# Patient Record
Sex: FEMALE | URBAN - METROPOLITAN AREA
[De-identification: names, ages, dates, MRNs, and addresses within clinical notes are randomized per-mention and may not be internally consistent; named-entity substitution may affect disease eponyms.]

---

## 2020-10-30 ENCOUNTER — IMPORTED ENCOUNTER (OUTPATIENT)
Dept: URBAN - METROPOLITAN AREA CLINIC 1 | Facility: CLINIC | Age: 25
End: 2020-10-30

## 2020-10-30 PROBLEM — H44.23: Noted: 2020-10-30

## 2020-10-30 PROBLEM — H52.223: Noted: 2020-10-30

## 2020-10-30 PROCEDURE — S0620 ROUTINE OPHTHALMOLOGICAL EXA: HCPCS

## 2020-10-30 NOTE — PATIENT DISCUSSION
1. Myopia w/ Astigmatism OU -- Rx was given for correction if indicated and requested. 2. Glaucoma Suspect OU -- (C/D: 0.70/0.75) IOP 18 OU. (-) Family Hx. 3.  Ocular Migraine -- Discussed with patient and reassurance given. CTL Trials given to patient today ([employer] [facility]). Return for an appointment in 1-2 week cc with Dr. Jacobsen. Return for an appointment in 1 month 30/OCT/VF with Dr. Jacobsen.

## 2020-11-12 ENCOUNTER — IMPORTED ENCOUNTER (OUTPATIENT)
Dept: URBAN - METROPOLITAN AREA CLINIC 1 | Facility: CLINIC | Age: 25
End: 2020-11-12

## 2020-11-12 NOTE — PATIENT DISCUSSION
1.  CC today: comfort and vision good. Finalized CTL Rx. Printed and gave patient CTL RxReturn for an appointment in 1 yr 40/cc with Dr. Rayshawn Costello.

## 2020-12-10 ENCOUNTER — IMPORTED ENCOUNTER (OUTPATIENT)
Dept: URBAN - METROPOLITAN AREA CLINIC 1 | Facility: CLINIC | Age: 25
End: 2020-12-10

## 2020-12-10 PROBLEM — H40.013: Noted: 2020-12-10

## 2020-12-10 PROBLEM — G43.909: Noted: 2020-12-10

## 2020-12-10 PROCEDURE — 92014 COMPRE OPH EXAM EST PT 1/>: CPT

## 2020-12-10 PROCEDURE — 92133 CPTRZD OPH DX IMG PST SGM ON: CPT

## 2020-12-10 PROCEDURE — 92083 EXTENDED VISUAL FIELD XM: CPT

## 2020-12-10 NOTE — PATIENT DISCUSSION
1.  Glaucoma Suspect OU- (C/D: 0.70/0.75) IOP 20/18 (-) Family Hx. Patient is considered Low Risk. Condition was discussed with patient and patient understands. Will continue to monitor patient for any progression in condition. Patient was advised to call us with any problems questions or concerns. OCT and VF done today shows WNL. 2. Ocular Migraine- Reassurance and explanation was given to patient. Return for an appointment in OCT 40/CC with Dr. Rayshawn Costello. Return for an appointment in 1 yr 30/OCT with Dr. Rayshawn Costello.

## 2021-10-19 ENCOUNTER — IMPORTED ENCOUNTER (OUTPATIENT)
Dept: URBAN - METROPOLITAN AREA CLINIC 1 | Facility: CLINIC | Age: 26
End: 2021-10-19

## 2021-10-19 PROBLEM — H52.13: Noted: 2021-10-19

## 2021-10-19 PROCEDURE — S0621 ROUTINE OPHTHALMOLOGICAL EXA: HCPCS

## 2021-10-19 NOTE — PATIENT DISCUSSION
1. Myopia: Rx was given for correction if indicated and requested. 2. Glaucoma Suspect OU (CD 0.70/0.75) - IOP stable (-) Family Hx. Patient is considered Low Risk. 3. H/o Ocular MigraineMRX for glasses given. Finalized CTL Juliana Riley for an appointment in 1 year 40/cc with Dr. Soraya Jean.

## 2021-10-19 NOTE — PATIENT DISCUSSION
Glaucoma Suspect OU (CD 0.70/0.75) - IOP stable (-) Family Hx. Patient is considered Low Risk. 3. H/o Ocular MigraineMRX for glasses given. Finalized CTL Beena Rodrigues for an appointment in 1 year 40/cc with Dr. Shonna Goncalves.

## 2022-04-02 ASSESSMENT — KERATOMETRY
OS_K1POWER_DIOPTERS: 45.50
OS_AXISANGLE2_DEGREES: 065
OD_K1POWER_DIOPTERS: 45.00
OS_K2POWER_DIOPTERS: 47.00
OD_K2POWER_DIOPTERS: 46.50
OS_AXISANGLE_DEGREES: 155
OD_AXISANGLE2_DEGREES: 105
OD_AXISANGLE_DEGREES: 015

## 2022-04-02 ASSESSMENT — VISUAL ACUITY
OD_SC: 20/25
OD_SC: 20/20
OS_CC: J1+
OD_SC: 20/25
OS_SC: 20/25-1
OD_SC: 20/20-1
OS_SC: 20/25
OS_CC: J1
OD_CC: J1+
OS_CC: J1+
OS_SC: 20/25
OD_CC: J1+
OS_CC: J1+
OS_SC: 20/30
OD_CC: J1+
OD_CC: J1

## 2022-04-02 ASSESSMENT — TONOMETRY
OS_IOP_MMHG: 19
OD_IOP_MMHG: 18
OD_IOP_MMHG: 19
OS_IOP_MMHG: 18
OD_IOP_MMHG: 20
OS_IOP_MMHG: 18